# Patient Record
Sex: MALE | Race: BLACK OR AFRICAN AMERICAN | NOT HISPANIC OR LATINO | Employment: FULL TIME | URBAN - METROPOLITAN AREA
[De-identification: names, ages, dates, MRNs, and addresses within clinical notes are randomized per-mention and may not be internally consistent; named-entity substitution may affect disease eponyms.]

---

## 2018-01-15 ENCOUNTER — HOSPITAL ENCOUNTER (EMERGENCY)
Facility: HOSPITAL | Age: 28
Discharge: HOME | End: 2018-01-15
Payer: MEDICAID

## 2018-01-15 VITALS
TEMPERATURE: 98 F | OXYGEN SATURATION: 97 % | WEIGHT: 179 LBS | DIASTOLIC BLOOD PRESSURE: 91 MMHG | HEART RATE: 120 BPM | BODY MASS INDEX: 22.97 KG/M2 | RESPIRATION RATE: 20 BRPM | HEIGHT: 74 IN | SYSTOLIC BLOOD PRESSURE: 118 MMHG

## 2018-01-15 DIAGNOSIS — N48.9 PENILE LESION: Primary | ICD-10-CM

## 2018-01-15 PROCEDURE — 99283 EMERGENCY DEPT VISIT LOW MDM: CPT

## 2018-01-15 PROCEDURE — 87252 VIRUS INOCULATION TISSUE: CPT

## 2018-01-15 RX ORDER — ACYCLOVIR 400 MG/1
400 TABLET ORAL 3 TIMES DAILY
Qty: 30 TABLET | Refills: 0 | Status: SHIPPED | OUTPATIENT
Start: 2018-01-15 | End: 2020-02-11 | Stop reason: CLARIF

## 2018-01-15 NOTE — ED PROVIDER NOTES
Encounter Date: 1/15/2018       History     Chief Complaint   Patient presents with    Penile Discharge     c/o bump on foreskin and hurts to touch     The patient presents to the ER c/o a painful genital sore to the foreskin of his penis. He states that the sore or lesion has been present for the past 3-4 days. He states that this is a new problem. He denies any additional symptoms. He denies any pre-arrival treatment. He denies any mitigating or exacerbating factors. He is sexually active and denies any protection or prophylaxis use.           Review of patient's allergies indicates:  No Known Allergies  Past Medical History:   Diagnosis Date    Enlarged heart      History reviewed. No pertinent surgical history.  Family History   Problem Relation Age of Onset    Diabetes Mother     Hypertension Mother      Social History   Substance Use Topics    Smoking status: Current Every Day Smoker     Packs/day: 1.00     Types: Cigarettes    Smokeless tobacco: Never Used    Alcohol use No     Review of Systems   Constitutional: Negative for chills, fever and unexpected weight change.   HENT: Negative for sore throat.    Respiratory: Negative for cough and shortness of breath.    Cardiovascular: Negative for chest pain.   Gastrointestinal: Negative for abdominal pain, diarrhea, nausea, rectal pain and vomiting.   Genitourinary: Positive for genital sores and penile pain. Negative for decreased urine volume, difficulty urinating, discharge, dysuria, flank pain, frequency, penile swelling, scrotal swelling and testicular pain.   Musculoskeletal: Negative for arthralgias, back pain and joint swelling.   Skin: Negative for rash.   Allergic/Immunologic: Negative for immunocompromised state.   Neurological: Negative for dizziness, light-headedness and headaches.   Hematological: Negative for adenopathy.   Psychiatric/Behavioral: Negative for confusion.       Physical Exam     Initial Vitals [01/15/18 1102]   BP Pulse Resp  Temp SpO2   (!) 118/91 (!) 120 20 98.4 °F (36.9 °C) 97 %      MAP       100         Physical Exam    Nursing note and vitals reviewed.  Constitutional: He appears well-developed.   HENT:   Mouth/Throat: Oropharynx is clear and moist.   Eyes: Conjunctivae are normal.   Neck: Normal range of motion.   Cardiovascular: Normal rate.   Pulmonary/Chest: Breath sounds normal. No respiratory distress.   Abdominal: Soft. There is no tenderness. There is no guarding.   Genitourinary:   Genitourinary Comments: Uncircumcised penis. No penile swelling. No scrotal pain or swelling. No testicle pain or swelling. There is a sub-acute appearing lesion to his foreskin that appears to be herpetic. Mildly tender. Appears to be ulcerated from a ruptured vesicle. No urethral discharge. No inguinal adenopathy. No hernia.       Musculoskeletal: Normal range of motion.   Neurological: He is alert and oriented to person, place, and time. He has normal strength. No cranial nerve deficit or sensory deficit.   Skin: Skin is warm and dry.   Psychiatric: He has a normal mood and affect.         ED Course   Procedures  Labs Reviewed   CULTURE, VIRAL   No results found for this or any previous visit.           Medical Decision Making:   Initial Assessment:   Pt here for evaluation of genital lesion. New problem. Recent unprotected sexual intercourse with new partner.   Clinical Tests:   Lab Tests: Ordered  ED Management:  Lesion appears to be herpetic.   Viral culture obtained and sent to lab  Rx for Acyclovir provided.   Patient advised to follow up closely with his PCP and to have sexual partners tested/treated.               Attending Attestation:     Physician Attestation Statement for NP/PA:   I discussed this assessment and plan of this patient with the NP/PA, but I did not personally examine the patient. The face to face encounter was performed by the NP/PA.                  ED Course      Clinical Impression:   The encounter diagnosis was  Penile lesion.    Disposition:   Disposition: Discharged  Condition: Stable                        Hussein Weir PA-C  01/15/18 1200       José Miguel Grant MD  01/16/18 8812

## 2018-02-09 LAB
SPECIMEN SOURCE: NORMAL
VIRUS SPEC CULT: NORMAL

## 2018-06-09 ENCOUNTER — HOSPITAL ENCOUNTER (EMERGENCY)
Facility: HOSPITAL | Age: 28
Discharge: HOME | End: 2018-06-09

## 2018-06-09 VITALS
OXYGEN SATURATION: 98 % | SYSTOLIC BLOOD PRESSURE: 151 MMHG | WEIGHT: 190.56 LBS | HEIGHT: 75 IN | BODY MASS INDEX: 23.69 KG/M2 | DIASTOLIC BLOOD PRESSURE: 75 MMHG | TEMPERATURE: 98 F | HEART RATE: 100 BPM | RESPIRATION RATE: 20 BRPM

## 2018-06-09 DIAGNOSIS — N61.1 ACUTE ABSCESS OF AREOLA: Primary | ICD-10-CM

## 2018-06-09 PROCEDURE — 99283 EMERGENCY DEPT VISIT LOW MDM: CPT | Mod: 25

## 2018-06-09 PROCEDURE — 25000003 PHARM REV CODE 250: Performed by: PHYSICIAN ASSISTANT

## 2018-06-09 PROCEDURE — 10061 I&D ABSCESS COMP/MULTIPLE: CPT

## 2018-06-09 RX ORDER — LIDOCAINE HYDROCHLORIDE AND EPINEPHRINE 20; 10 MG/ML; UG/ML
10 INJECTION, SOLUTION INFILTRATION; PERINEURAL
Status: COMPLETED | OUTPATIENT
Start: 2018-06-09 | End: 2018-06-09

## 2018-06-09 RX ORDER — DICLOFENAC SODIUM 75 MG/1
75 TABLET, DELAYED RELEASE ORAL 2 TIMES DAILY
Qty: 20 TABLET | Refills: 0 | Status: SHIPPED | OUTPATIENT
Start: 2018-06-09 | End: 2020-02-11 | Stop reason: CLARIF

## 2018-06-09 RX ORDER — SULFAMETHOXAZOLE AND TRIMETHOPRIM 800; 160 MG/1; MG/1
2 TABLET ORAL 2 TIMES DAILY
Qty: 40 TABLET | Refills: 0 | Status: SHIPPED | OUTPATIENT
Start: 2018-06-09 | End: 2018-06-19

## 2018-06-09 RX ADMIN — LIDOCAINE HYDROCHLORIDE,EPINEPHRINE BITARTRATE 10 ML: 20; .01 INJECTION, SOLUTION INFILTRATION; PERINEURAL at 09:06

## 2018-06-09 NOTE — ED PROVIDER NOTES
"Encounter Date: 6/9/2018       History     Chief Complaint   Patient presents with    Abscess     Pt states, "I have a boil on my chest and under my arm."     The history is provided by the patient.   Abscess    This is a new problem. The current episode started several days ago. The problem occurs rarely. The problem has been gradually worsening. Affected Location: right sub areola, right axilla. The pain is at a severity of 3/10. The abscess is characterized by painfulness and redness. Pertinent negatives include no anorexia, no decrease in physical activity, not sleeping less, not drinking less, no fever, no diarrhea, no vomiting, no congestion, no rhinorrhea, no sore throat, no decreased responsiveness and no cough.     Review of patient's allergies indicates:  No Known Allergies  Past Medical History:   Diagnosis Date    Enlarged heart      No past surgical history on file.  Family History   Problem Relation Age of Onset    Diabetes Mother     Hypertension Mother      Social History   Substance Use Topics    Smoking status: Current Every Day Smoker     Packs/day: 1.00     Types: Cigarettes    Smokeless tobacco: Never Used    Alcohol use No     Review of Systems   Constitutional: Negative for decreased responsiveness, diaphoresis and fever.   HENT: Negative for congestion, rhinorrhea and sore throat.    Eyes: Negative for photophobia and redness.   Respiratory: Negative for cough and shortness of breath.    Cardiovascular: Negative for chest pain.   Gastrointestinal: Negative for abdominal pain, anorexia, constipation, diarrhea, nausea and vomiting.   Endocrine: Negative for polydipsia and polyphagia.   Genitourinary: Negative for dysuria and frequency.   Musculoskeletal: Negative for back pain.   Skin: Negative for rash.        Chest wall abscess and right axillary abscess   Neurological: Negative for weakness.   Hematological: Does not bruise/bleed easily.   Psychiatric/Behavioral: The patient is not " nervous/anxious.    All other systems reviewed and are negative.      Physical Exam     Initial Vitals [06/09/18 0846]   BP Pulse Resp Temp SpO2   (!) 151/75 100 20 98 °F (36.7 °C) 98 %      MAP       100.33         Physical Exam    Nursing note and vitals reviewed.  Constitutional: He appears well-developed and well-nourished.   HENT:   Head: Normocephalic and atraumatic.   Right Ear: External ear normal.   Left Ear: External ear normal.   Nose: Nose normal.   Mouth/Throat: Oropharynx is clear and moist.   Eyes: Conjunctivae and EOM are normal. Pupils are equal, round, and reactive to light.   Neck: Normal range of motion. Neck supple.   Cardiovascular: Normal rate, regular rhythm, normal heart sounds and intact distal pulses.   Pulmonary/Chest: Breath sounds normal. No respiratory distress. He has no wheezes. He has no rhonchi. He has no rales.   Abdominal: Soft. Bowel sounds are normal. He exhibits no distension. There is no tenderness. There is no rebound and no guarding.   Musculoskeletal: Normal range of motion.   Neurological: He is alert and oriented to person, place, and time. He has normal strength.   Skin: Skin is warm and dry.   2 cm x 2 cm area of erythema, swelling and TTP to right sub-areolar region;  5 mm x 5 mm area of erythema, TTP and swelling to right axilla;  No fluctuance noted   Psychiatric: He has a normal mood and affect. His behavior is normal. Judgment and thought content normal.         ED Course   I & D - Incision and Drainage  Date/Time: 6/9/2018 9:40 AM  Performed by: NICKIE NGUYEN.  Authorized by: NICKIE NGUYEN   Type: abscess  Body area: trunk  Location details: right breast  Anesthesia: local infiltration    Anesthesia:  Local Anesthetic: lidocaine 2% with epinephrine  Risk factor: underlying major vessel and  underlying major nerve  Scalpel size: 11  Incision type: elliptical  Complexity: complex  Drainage: pus  Drainage amount: scant  Wound treatment: incision,   deloculation,  wound packed and  drainage  Packing material: 1/4 in gauze  Patient tolerance: Patient tolerated the procedure well with no immediate complications        Labs Reviewed - No data to display       No orders to display                             Clinical Impression:   The encounter diagnosis was Acute abscess of areola.      Disposition:   Disposition: Discharged  Condition: Stable                        NELA Cardenas  06/09/18 0941

## 2018-06-12 ENCOUNTER — HOSPITAL ENCOUNTER (EMERGENCY)
Facility: HOSPITAL | Age: 28
Discharge: HOME | End: 2018-06-12

## 2018-06-12 VITALS
TEMPERATURE: 98 F | BODY MASS INDEX: 23.21 KG/M2 | SYSTOLIC BLOOD PRESSURE: 128 MMHG | OXYGEN SATURATION: 95 % | DIASTOLIC BLOOD PRESSURE: 69 MMHG | HEART RATE: 106 BPM | WEIGHT: 180.88 LBS | HEIGHT: 74 IN | RESPIRATION RATE: 20 BRPM

## 2018-06-12 DIAGNOSIS — Z51.89 WOUND CHECK, ABSCESS: Primary | ICD-10-CM

## 2018-06-12 PROCEDURE — 99283 EMERGENCY DEPT VISIT LOW MDM: CPT

## 2018-06-12 RX ORDER — HYDROCODONE BITARTRATE AND ACETAMINOPHEN 10; 325 MG/1; MG/1
1 TABLET ORAL EVERY 4 HOURS PRN
Qty: 12 TABLET | Refills: 0 | Status: SHIPPED | OUTPATIENT
Start: 2018-06-12 | End: 2018-06-22

## 2018-06-12 NOTE — ED PROVIDER NOTES
"Encounter Date: 6/12/2018       History     Chief Complaint   Patient presents with    Wound Check     Pt states, "I am here to get my packing taken out,:"     The history is provided by the patient.   Wound Check    He was treated in the ED 2 to 3 days ago. Previous treatment in the ED includes I&D of abscess. Treatments since wound repair include oral antibiotics and a wound recheck. There has been colored discharge from the wound. There is no redness present. There is no swelling present. The pain has improved. He has no difficulty moving the affected extremity or digit.     Review of patient's allergies indicates:  No Known Allergies  Past Medical History:   Diagnosis Date    Enlarged heart      No past surgical history on file.  Family History   Problem Relation Age of Onset    Diabetes Mother     Hypertension Mother      Social History   Substance Use Topics    Smoking status: Current Every Day Smoker     Packs/day: 1.00     Types: Cigarettes    Smokeless tobacco: Never Used    Alcohol use No     Review of Systems   Constitutional: Negative for diaphoresis and fever.   HENT: Negative for sore throat.    Eyes: Negative for photophobia and redness.   Respiratory: Negative for shortness of breath.    Cardiovascular: Negative for chest pain.   Gastrointestinal: Negative for abdominal pain, constipation, diarrhea, nausea and vomiting.   Endocrine: Negative for polydipsia and polyphagia.   Genitourinary: Negative for dysuria and frequency.   Musculoskeletal: Negative for back pain.   Skin: Positive for wound. Negative for rash.   Neurological: Negative for weakness.   Hematological: Does not bruise/bleed easily.   Psychiatric/Behavioral: The patient is not nervous/anxious.    All other systems reviewed and are negative.      Physical Exam     Initial Vitals [06/12/18 0907]   BP Pulse Resp Temp SpO2   128/69 106 20 98.3 °F (36.8 °C) 95 %      MAP       --         Physical Exam    Nursing note and vitals " reviewed.  Constitutional: He appears well-developed and well-nourished.   HENT:   Head: Normocephalic and atraumatic.   Right Ear: External ear normal.   Left Ear: External ear normal.   Nose: Nose normal.   Mouth/Throat: Oropharynx is clear and moist.   Eyes: Conjunctivae and EOM are normal. Pupils are equal, round, and reactive to light.   Neck: Normal range of motion. Neck supple.   Cardiovascular: Normal rate, regular rhythm, normal heart sounds and intact distal pulses.   Pulmonary/Chest: Breath sounds normal. No respiratory distress. He has no wheezes. He has no rhonchi. He has no rales.   Abdominal: Soft. Bowel sounds are normal. He exhibits no distension. There is no tenderness. There is no rebound and no guarding.   Musculoskeletal: Normal range of motion.   Neurological: He is alert and oriented to person, place, and time. He has normal strength.   Skin: Skin is warm and dry.   Healing abscess to right areola;  No erythema, swelling or active drainage noted;   Psychiatric: He has a normal mood and affect. His behavior is normal. Judgment and thought content normal.         ED Course   Procedures  Labs Reviewed - No data to display       No orders to display        Medical Decision Making:   ED Management:  Packing removed from abscess;  Abscess repacked with 1/4 sterile gauze.                      Clinical Impression:   The encounter diagnosis was Wound check, abscess.      Disposition:   Disposition: Discharged  Condition: Stable                        NELA Cardenas  06/12/18 0940

## 2018-06-14 VITALS
WEIGHT: 181.88 LBS | DIASTOLIC BLOOD PRESSURE: 78 MMHG | HEART RATE: 89 BPM | SYSTOLIC BLOOD PRESSURE: 135 MMHG | OXYGEN SATURATION: 98 % | HEIGHT: 74 IN | TEMPERATURE: 98 F | BODY MASS INDEX: 23.34 KG/M2 | RESPIRATION RATE: 18 BRPM

## 2018-06-14 PROCEDURE — 99281 EMR DPT VST MAYX REQ PHY/QHP: CPT

## 2018-06-15 ENCOUNTER — HOSPITAL ENCOUNTER (EMERGENCY)
Facility: HOSPITAL | Age: 28
Discharge: HOME | End: 2018-06-15
Attending: EMERGENCY MEDICINE

## 2018-06-15 DIAGNOSIS — L02.91 ABSCESS: Primary | ICD-10-CM

## 2018-06-15 RX ORDER — DICLOFENAC SODIUM 50 MG/1
50 TABLET, DELAYED RELEASE ORAL 2 TIMES DAILY
Qty: 15 TABLET | Refills: 0 | Status: SHIPPED | OUTPATIENT
Start: 2018-06-15 | End: 2020-02-11 | Stop reason: CLARIF

## 2018-06-15 NOTE — ED PROVIDER NOTES
SCRIBE #1 NOTE: I, Aminah Reyes, am scribing for, and in the presence of, Parker Tillman MD. I have scribed the entire note.      History      Chief Complaint   Patient presents with    Wound Check     requesting for packing to be removed from abscess on chest       Review of patient's allergies indicates:  No Known Allergies     HPI   HPI    6/15/2018, 12:08 AM   History obtained from the patient      History of Present Illness: Pollo Pak is a 28 y.o. male patient who presents to the Emergency Department for a wound check. Pt was told to return to the ER 2 days after an abscess removal for a f/u. No associated sxs reported. Patient denies any fever, chills, increased warmth/erythema/swelling to the area, drainage from the site, numbness/tingling, and all other sxs at this time. No further complaints or concerns at this time.     Arrival mode: Personal vehicle     PCP: Primary Doctor No       Past Medical History:  Past Medical History:   Diagnosis Date    Enlarged heart        Past Surgical History:  History reviewed. No pertinent surgical history.     Family History:  Family History   Problem Relation Age of Onset    Diabetes Mother     Hypertension Mother        Social History:  Social History     Social History Main Topics    Smoking status: Current Every Day Smoker     Packs/day: 1.00     Types: Cigarettes    Smokeless tobacco: Never Used    Alcohol use No    Drug use: No    Sexual activity: Yes     Partners: Female     Birth control/ protection: None       ROS   Review of Systems   Constitutional: Negative for chills, diaphoresis and fever.   HENT: Negative for congestion, rhinorrhea and sore throat.    Respiratory: Negative for cough and shortness of breath.    Cardiovascular: Negative for chest pain.   Gastrointestinal: Negative for diarrhea, nausea and vomiting.   Genitourinary: Negative for dysuria, frequency and hematuria.   Musculoskeletal: Negative for back pain.   Skin: Positive for  "wound (To RUQ chest). Negative for rash.        (-) drainage  (-) increased warmth/erythema/swelling   Neurological: Negative for dizziness, weakness, numbness and headaches.   Hematological: Does not bruise/bleed easily.     Physical Exam      Initial Vitals [06/14/18 2349]   BP Pulse Resp Temp SpO2   135/78 89 18 97.7 °F (36.5 °C) 98 %      MAP       --          Physical Exam  Nursing Notes and Vital Signs Reviewed.  Constitutional: Patient is in no acute distress. Well-developed and well-nourished.  Head: Atraumatic. Normocephalic.  Eyes: PERRL. EOM intact. Conjunctivae are not pale. No scleral icterus.  ENT: Mucous membranes are moist. Oropharynx is clear and symmetric.    Neck: Supple. Full ROM. No lymphadenopathy.  Cardiovascular: Regular rate. Regular rhythm. No murmurs, rubs, or gallops. Distal pulses are 2+ and symmetric.  Pulmonary/Chest: No respiratory distress. Clear to auscultation bilaterally. No wheezing or rales.  Abdominal: Soft and non-distended.  There is no tenderness.  No rebound, guarding, or rigidity.   Musculoskeletal: Moves all extremities. No obvious deformities. No edema.  Skin: 1 cm lac to RUQ of chest. No induration, fluctuance, or drainage from site.  Neurological:  Alert, awake, and appropriate.  Normal speech.  No acute focal neurological deficits are appreciated.  Psychiatric: Normal affect. Good eye contact. Appropriate in content.    ED Course    Procedures  ED Vital Signs:  Vitals:    06/14/18 2349   BP: 135/78   Pulse: 89   Resp: 18   Temp: 97.7 °F (36.5 °C)   TempSrc: Oral   SpO2: 98%   Weight: 82.5 kg (181 lb 14.1 oz)   Height: 6' 2" (1.88 m)              The Emergency Provider reviewed the vital signs and test results, which are outlined above.    ED Discussion     12:13 AM: Discussed with pt all pertinent ED information and results. Gave pt all f/u and return to the ED instructions. All questions and concerns were addressed at this time. Pt expresses understanding of " information and instructions, and is comfortable with plan to discharge. Pt is stable for discharge.    I discussed with patient and/or family/caretaker that evaluation in the ED does not suggest any emergent or life threatening medical conditions requiring immediate intervention beyond what was provided in the ED, and I believe patient is safe for discharge.  Regardless, an unremarkable evaluation in the ED does not preclude the development or presence of a serious of life threatening condition. As such, patient was instructed to return immediately for any worsening or change in current symptoms.      ED Medication(s):  Medications - No data to display    New Prescriptions    DICLOFENAC (VOLTAREN) 50 MG EC TABLET    Take 1 tablet (50 mg total) by mouth 2 (two) times daily.       Follow-up Information     [facility] In 2 days.    Specialty:  Internal Medicine  Contact information:  [address] [facility] [address] [phone]  Additional information:  (off [facility]) 1st floor           [facility] .    Specialty:  Emergency Medicine  Why:  If symptoms worsen  Contact information:  [address] [facility] [address] [phone]                   Medical Decision Making              Scribe Attestation:   Scribe #1: I performed the above scribed service and the documentation accurately describes the services I performed. I attest to the accuracy of the note.    Attending:   Physician Attestation Statement for Scribe #1: I, Parker Tillman MD, personally performed the services described in this documentation, as scribed by Aminah Reyes, in my presence, and it is both accurate and complete.          Clinical Impression       ICD-10-CM ICD-9-CM   1. Abscess L02.91 682.9       Disposition:   Disposition: Discharged  Condition: Stable         Parker Tillman MD  06/15/18 0034

## 2020-02-10 PROCEDURE — 99285 EMERGENCY DEPT VISIT HI MDM: CPT | Mod: 25

## 2020-02-10 PROCEDURE — 29125 APPL SHORT ARM SPLINT STATIC: CPT | Mod: 59,LT

## 2020-02-10 PROCEDURE — 20605 DRAIN/INJ JOINT/BURSA W/O US: CPT | Mod: LT

## 2020-02-11 ENCOUNTER — HOSPITAL ENCOUNTER (EMERGENCY)
Facility: HOSPITAL | Age: 30
Discharge: HOME | End: 2020-02-11
Attending: EMERGENCY MEDICINE
Payer: MEDICAID

## 2020-02-11 VITALS
OXYGEN SATURATION: 98 % | SYSTOLIC BLOOD PRESSURE: 147 MMHG | WEIGHT: 186.75 LBS | DIASTOLIC BLOOD PRESSURE: 83 MMHG | HEIGHT: 75 IN | TEMPERATURE: 98 F | HEART RATE: 87 BPM | RESPIRATION RATE: 20 BRPM | BODY MASS INDEX: 23.22 KG/M2

## 2020-02-11 DIAGNOSIS — M25.532 LEFT WRIST PAIN: ICD-10-CM

## 2020-02-11 DIAGNOSIS — S69.90XA WRIST INJURY: ICD-10-CM

## 2020-02-11 DIAGNOSIS — L03.113 CELLULITIS OF RIGHT UPPER EXTREMITY: Primary | ICD-10-CM

## 2020-02-11 PROCEDURE — 29125 APPL SHORT ARM SPLINT STATIC: CPT | Mod: 59,LT

## 2020-02-11 PROCEDURE — 20605 DRAIN/INJ JOINT/BURSA W/O US: CPT | Mod: LT

## 2020-02-11 PROCEDURE — 25000003 PHARM REV CODE 250: Performed by: EMERGENCY MEDICINE

## 2020-02-11 RX ORDER — LIDOCAINE HYDROCHLORIDE 10 MG/ML
1 INJECTION, SOLUTION EPIDURAL; INFILTRATION; INTRACAUDAL; PERINEURAL
Status: COMPLETED | OUTPATIENT
Start: 2020-02-11 | End: 2020-02-11

## 2020-02-11 RX ORDER — SULFAMETHOXAZOLE AND TRIMETHOPRIM 800; 160 MG/1; MG/1
1 TABLET ORAL 2 TIMES DAILY
Qty: 14 TABLET | Refills: 0 | Status: SHIPPED | OUTPATIENT
Start: 2020-02-11 | End: 2020-02-18

## 2020-02-11 RX ADMIN — LIDOCAINE HYDROCHLORIDE 10 MG: 10 INJECTION, SOLUTION EPIDURAL; INFILTRATION; INTRACAUDAL; PERINEURAL at 04:02

## 2020-02-11 NOTE — ED NOTES
Pt reports left hand/wrist pain after falling on it Sunday; wrist noted to be red and swollen. Denies any other associated symptoms.     Patient identifiers verified and correct for Pollo Pak.    LOC: The patient is awake, alert and aware of environment with an appropriate affect, the patient is oriented x 3 and speaking appropriately.  APPEARANCE: Patient resting comfortably and in no acute distress, patient is clean and well groomed, patient's clothing is properly fastened.  SKIN: The skin is warm and dry, color consistent with ethnicity, patient has normal skin turgor and moist mucus membranes, skin intact, no breakdown or bruising noted.  MUSCULOSKELETAL: Patient moving all extremities spontaneously.  RESPIRATORY: Airway is open and patent, respirations are spontaneous.  CARDIAC: Patient has a normal rate, no periphreal edema noted, capillary refill < 3 seconds.  ABDOMEN: Soft and non tender to palpation.

## 2020-02-11 NOTE — ED PROVIDER NOTES
SCRIBE #1 NOTE: I, Germaine Angel, am scribing for, and in the presence of, Eduar Rush MD. I have scribed the ED discussion.     History      Chief Complaint   Patient presents with    Hand Pain     left hand/wrist pain and swelling. pt reports falling on Sunday and caught himself with his hand.        Review of patient's allergies indicates:  No Known Allergies     HPI   HPI    2/11/2020, 1:17 AM   History obtained from the patient      History of Present Illness: Pollo Pak is a 29 y.o. male patient who presents to the Emergency Department for left hand pain x one week. Patient states that he fell down steps and caught himself on left hand.  Treatments tried include Aleve and ice pack.  Denies fever, vomiting, diarrhea, chest pain, SOB, headache, dizziness.       Arrival mode: Personal vehicle      PCP: Primary Doctor No       Past Medical History:  Past Medical History:   Diagnosis Date    Enlarged heart        Past Surgical History:  History reviewed. No pertinent surgical history.      Family History:  Family History   Problem Relation Age of Onset    Diabetes Mother     Hypertension Mother        Social History:  Social History     Tobacco Use    Smoking status: Current Every Day Smoker     Packs/day: 0.50     Types: Cigarettes    Smokeless tobacco: Never Used   Substance and Sexual Activity    Alcohol use: Yes     Comment: occ    Drug use: No    Sexual activity: Yes     Partners: Female     Birth control/protection: None       ROS   Review of Systems   Constitutional: Negative for chills and fever.   HENT: Negative for congestion and rhinorrhea.    Eyes: Negative for discharge and redness.   Respiratory: Negative for cough and wheezing.    Cardiovascular: Negative for chest pain and palpitations.   Gastrointestinal: Negative for diarrhea, nausea and vomiting.   Genitourinary: Negative for dysuria and frequency.   Musculoskeletal: Positive for arthralgias and myalgias.   Skin: Negative for  rash and wound.   Neurological: Negative for dizziness and headaches.       Physical Exam      Initial Vitals [02/10/20 2223]   BP Pulse Resp Temp SpO2   (!) 144/82 102 20 97.4 °F (36.3 °C) 98 %      MAP       --          Physical Exam  Nursing Notes and Vital Signs Reviewed.  Constitutional: Patient is in no apparent distress. Awake and alert. Well-developed and well-nourished.  Head: Atraumatic. Normocephalic.  Eyes: PERRL. EOM intact. Conjunctivae are not pale. No scleral icterus.  ENT: Mucous membranes are moist. Oropharynx is clear and symmetric.    Neck: Supple. Full ROM. No lymphadenopathy.  Cardiovascular: Regular rate. Regular rhythm. No murmurs, rubs, or gallops. Distal pulses are 2+ and symmetric.  Pulmonary/Chest: No respiratory distress. Clear to auscultation bilaterally. No wheezing, rales, or rhonchi.  Abdominal: Soft and non-distended.  There is no tenderness.  No rebound, guarding, or rigidity.  Musculoskeletal: Moves all extremities. No obvious deformities. No edema. No calf tenderness.  LUE:  Swelling of wrist.  Pain with flexion and extension of wrist.  Full ROM.  Brisk capillary refill. Radial pulse 2+.  TTP over thumb and first metacarpal.  Erythema of wrist and forearm.   Skin: Warm and dry.  Neurological:  Alert, awake, and appropriate.  Normal speech.  No acute focal neurological deficits are appreciated.  Psychiatric: Normal affect. Good eye contact. Appropriate in content.    ED Course    Arthrocentesis  Date/Time: 2/11/2020 5:07 AM  Performed by: Eduar Rush MD  Authorized by: Eduar Rush MD   Consent Done: Yes  Consent: Verbal consent obtained.  Risks and benefits: risks, benefits and alternatives were discussed  Consent given by: patient  Patient understanding: patient states understanding of the procedure being performed  Patient consent: the patient's understanding of the procedure matches consent given  Imaging studies: imaging studies available  Patient identity confirmed:  ", verbally with patient and name  Indications: joint swelling   Body area: wrist  Joint: left wrist  Local anesthesia used: yes  Anesthesia: local infiltration    Anesthesia:  Local anesthesia used: yes  Local Anesthetic: lidocaine 1% without epinephrine  Patient sedated: no  Preparation: Patient was prepped and draped in the usual sterile fashion.  Needle size: 20 G  Approach: lateral  Aspirate amount: 0 mL  Patient tolerance: Patient tolerated the procedure well with no immediate complications  Complications: No  Specimens: No  Implants: No    Splint Application  Date/Time: 2020 5:54 AM  Performed by: Eduar Rush MD  Authorized by: Eduar Rush MD   Consent Done: Yes  Consent: Verbal consent obtained.  Risks and benefits: risks, benefits and alternatives were discussed  Consent given by: patient  Patient understanding: patient states understanding of the procedure being performed  Patient consent: the patient's understanding of the procedure matches consent given  Procedure consent: procedure consent matches procedure scheduled  Relevant documents: relevant documents present and verified  Test results: test results available and properly labeled  Imaging studies: imaging studies available  Patient identity confirmed: , verbally with patient and name  Location details: left wrist  Splint type: wrist brace.  Post-procedure: The splinted body part was neurovascularly unchanged following the procedure.  Patient tolerance: Patient tolerated the procedure well with no immediate complications        ED Vital Signs:  Vitals:    02/10/20 2223 20 0130 20 0330   BP: (!) 144/82 (!) 141/80 (!) 142/81   Pulse: 102 98 89   Resp: 20 20 18   Temp: 97.4 °F (36.3 °C) 98 °F (36.7 °C)    TempSrc: Oral Oral    SpO2: 98% 98% 98%   Weight: 84.7 kg (186 lb 11.7 oz)     Height: 6' 3" (1.905 m)         Abnormal Lab Results:  Labs Reviewed - No data to display     All Lab Results:  none    Imaging " Results:  Imaging Results          X-Ray Forearm Left (In process)                X-Ray Wrist Complete Left (In process)                X-Ray Hand 3 view Left (In process)                Per ED physician, pt's L forearm XR results: NAF.    Per ED physician, pt's L wrist XR results: no fracture.    Per ED physician, pt's L hand XR results: no fracture.           The Emergency Provider reviewed the vital signs and test results, which are outlined above.    ED Discussion     5:10 AM: Reassessed pt at this time. Discussed with pt all pertinent ED information and results. Discussed pt dx and plan of tx. Gave pt all f/u and return to the ED instructions. All questions and concerns were addressed at this time. Pt expresses understanding of information and instructions, and is comfortable with plan to discharge. Pt is stable for discharge.    I discussed with patient and/or family/caretaker that evaluation in the ED does not suggest any emergent or life threatening medical conditions requiring immediate intervention beyond what was provided in the ED, and I believe patient is safe for discharge.  Regardless, an unremarkable evaluation in the ED does not preclude the development or presence of a serious of life threatening condition. As such, patient was instructed to return immediately for any worsening or change in current symptoms.      ED Medication(s):  Medications   lidocaine (PF) 10 mg/ml (1%) injection 10 mg (10 mg Other Given 2/11/20 0451)       New Prescriptions    SULFAMETHOXAZOLE-TRIMETHOPRIM 800-160MG (BACTRIM DS) 800-160 MG TAB    Take 1 tablet by mouth 2 (two) times daily. for 7 days       Follow-up Information     [facility]. Schedule an appointment as soon as possible for a visit in 2 days.    Why:  For re-evaluation and further treatment, For wound re-check  Contact information:  [address] [facility] [id number] [phone]             [facility]. Go today.     Specialty:  Emergency Medicine  Why:  If symptoms worsen, For re-evaluation and further treatment  Contact information:  [address] [facility] [address] [phone]                   Medical Decision Making    Medical Decision Making:   Clinical Tests:   Radiological Study: Ordered and Reviewed           Scribe Attestation:   Scribe #1: I performed the above scribed service and the documentation accurately describes the services I performed. I attest to the accuracy of the note.     Attending Attestation:               Physician Attestation for Scribe:  Physician Attestation Statement for Scribe #1: I, Eduar Rush MD, reviewed documentation, as scribed by Germaine Angel in my presence, and it is both accurate and complete.     Clinical Impression       ICD-10-CM ICD-9-CM   1. Cellulitis of right upper extremity L03.113 682.3   2. Wrist injury S69.90XA 959.3   3. Left wrist pain M25.532 719.43       Disposition:   Disposition: Discharged  Condition: Stable           Florence Ceballos PA-C  02/13/20 1220

## 2020-02-11 NOTE — ED NOTES
Patient sitting up in chair, no acute distress noted, awake, alert, and oriented x 3, calm, respirations even and unlabored, and skin is warm and dry. Patient verbalized understanding of status and plan of care. Patient denies needs at this time. Will continue to monitor.

## 2021-08-03 ENCOUNTER — HOSPITAL ENCOUNTER (EMERGENCY)
Facility: HOSPITAL | Age: 31
Discharge: HOME | End: 2021-08-03
Attending: FAMILY MEDICINE
Payer: MEDICAID

## 2021-08-03 VITALS
RESPIRATION RATE: 16 BRPM | OXYGEN SATURATION: 99 % | DIASTOLIC BLOOD PRESSURE: 75 MMHG | TEMPERATURE: 99 F | BODY MASS INDEX: 22.84 KG/M2 | HEART RATE: 88 BPM | SYSTOLIC BLOOD PRESSURE: 127 MMHG | WEIGHT: 182.75 LBS

## 2021-08-03 DIAGNOSIS — R41.82 ALTERED MENTAL STATUS: ICD-10-CM

## 2021-08-03 DIAGNOSIS — T50.901A ACCIDENTAL DRUG OVERDOSE, INITIAL ENCOUNTER: Primary | ICD-10-CM

## 2021-08-03 DIAGNOSIS — T50.901A DRUG OVERDOSE: ICD-10-CM

## 2021-08-03 LAB
ALBUMIN SERPL BCP-MCNC: 4.1 G/DL (ref 3.5–5.2)
ALP SERPL-CCNC: 77 U/L (ref 55–135)
ALT SERPL W/O P-5'-P-CCNC: 50 U/L (ref 10–44)
AMPHET+METHAMPHET UR QL: NEGATIVE
ANION GAP SERPL CALC-SCNC: 17 MMOL/L (ref 8–16)
AST SERPL-CCNC: 63 U/L (ref 10–40)
BARBITURATES UR QL SCN>200 NG/ML: NEGATIVE
BASOPHILS # BLD AUTO: 0.09 K/UL (ref 0–0.2)
BASOPHILS NFR BLD: 0.9 % (ref 0–1.9)
BENZODIAZ UR QL SCN>200 NG/ML: NEGATIVE
BILIRUB SERPL-MCNC: 0.8 MG/DL (ref 0.1–1)
BILIRUB UR QL STRIP: NEGATIVE
BNP SERPL-MCNC: <10 PG/ML (ref 0–99)
BUN SERPL-MCNC: 6 MG/DL (ref 6–20)
BZE UR QL SCN: NEGATIVE
CALCIUM SERPL-MCNC: 9.4 MG/DL (ref 8.7–10.5)
CANNABINOIDS UR QL SCN: NEGATIVE
CHLORIDE SERPL-SCNC: 106 MMOL/L (ref 95–110)
CLARITY UR: CLEAR
CO2 SERPL-SCNC: 18 MMOL/L (ref 23–29)
COLOR UR: YELLOW
CREAT SERPL-MCNC: 1 MG/DL (ref 0.5–1.4)
CREAT UR-MCNC: 186.5 MG/DL (ref 23–375)
CTP QC/QA: YES
DIFFERENTIAL METHOD: ABNORMAL
EOSINOPHIL # BLD AUTO: 0.2 K/UL (ref 0–0.5)
EOSINOPHIL NFR BLD: 1.5 % (ref 0–8)
ERYTHROCYTE [DISTWIDTH] IN BLOOD BY AUTOMATED COUNT: 15.1 % (ref 11.5–14.5)
EST. GFR  (AFRICAN AMERICAN): >60 ML/MIN/1.73 M^2
EST. GFR  (NON AFRICAN AMERICAN): >60 ML/MIN/1.73 M^2
ETHANOL SERPL-MCNC: 67 MG/DL
GLUCOSE SERPL-MCNC: 111 MG/DL (ref 70–110)
GLUCOSE UR QL STRIP: NEGATIVE
HCT VFR BLD AUTO: 36.8 % (ref 40–54)
HCV AB SERPL QL IA: NEGATIVE
HEP C VIRUS HOLD SPECIMEN: NORMAL
HGB BLD-MCNC: 13.6 G/DL (ref 14–18)
HGB UR QL STRIP: NEGATIVE
HIV 1+2 AB+HIV1 P24 AG SERPL QL IA: NEGATIVE
IMM GRANULOCYTES # BLD AUTO: 0.02 K/UL (ref 0–0.04)
IMM GRANULOCYTES NFR BLD AUTO: 0.2 % (ref 0–0.5)
KETONES UR QL STRIP: NEGATIVE
LEUKOCYTE ESTERASE UR QL STRIP: NEGATIVE
LYMPHOCYTES # BLD AUTO: 5.2 K/UL (ref 1–4.8)
LYMPHOCYTES NFR BLD: 51.9 % (ref 18–48)
MCH RBC QN AUTO: 35.5 PG (ref 27–31)
MCHC RBC AUTO-ENTMCNC: 37 G/DL (ref 32–36)
MCV RBC AUTO: 96 FL (ref 82–98)
METHADONE UR QL SCN>300 NG/ML: NEGATIVE
MONOCYTES # BLD AUTO: 1 K/UL (ref 0.3–1)
MONOCYTES NFR BLD: 9.6 % (ref 4–15)
NEUTROPHILS # BLD AUTO: 3.6 K/UL (ref 1.8–7.7)
NEUTROPHILS NFR BLD: 35.9 % (ref 38–73)
NITRITE UR QL STRIP: NEGATIVE
NRBC BLD-RTO: 0 /100 WBC
OPIATES UR QL SCN: NEGATIVE
PCP UR QL SCN>25 NG/ML: NEGATIVE
PH UR STRIP: 6 [PH] (ref 5–8)
PLATELET # BLD AUTO: 351 K/UL (ref 150–450)
PMV BLD AUTO: 8.9 FL (ref 9.2–12.9)
POTASSIUM SERPL-SCNC: 3.8 MMOL/L (ref 3.5–5.1)
PROT SERPL-MCNC: 7.4 G/DL (ref 6–8.4)
PROT UR QL STRIP: NEGATIVE
RBC # BLD AUTO: 3.83 M/UL (ref 4.6–6.2)
SARS-COV-2 RDRP RESP QL NAA+PROBE: NEGATIVE
SODIUM SERPL-SCNC: 141 MMOL/L (ref 136–145)
SP GR UR STRIP: 1.02 (ref 1–1.03)
TOXICOLOGY INFORMATION: NORMAL
TROPONIN I SERPL DL<=0.01 NG/ML-MCNC: <0.006 NG/ML (ref 0–0.03)
URN SPEC COLLECT METH UR: NORMAL
UROBILINOGEN UR STRIP-ACNC: NEGATIVE EU/DL
WBC # BLD AUTO: 10 K/UL (ref 3.9–12.7)

## 2021-08-03 PROCEDURE — 96361 HYDRATE IV INFUSION ADD-ON: CPT

## 2021-08-03 PROCEDURE — 85025 COMPLETE CBC W/AUTO DIFF WBC: CPT | Performed by: FAMILY MEDICINE

## 2021-08-03 PROCEDURE — 93010 EKG 12-LEAD: ICD-10-PCS | Mod: ,,, | Performed by: INTERNAL MEDICINE

## 2021-08-03 PROCEDURE — 80307 DRUG TEST PRSMV CHEM ANLYZR: CPT | Performed by: FAMILY MEDICINE

## 2021-08-03 PROCEDURE — 81003 URINALYSIS AUTO W/O SCOPE: CPT | Mod: 59 | Performed by: FAMILY MEDICINE

## 2021-08-03 PROCEDURE — 96374 THER/PROPH/DIAG INJ IV PUSH: CPT

## 2021-08-03 PROCEDURE — U0002 COVID-19 LAB TEST NON-CDC: HCPCS | Performed by: FAMILY MEDICINE

## 2021-08-03 PROCEDURE — 25000003 PHARM REV CODE 250: Performed by: FAMILY MEDICINE

## 2021-08-03 PROCEDURE — 82077 ASSAY SPEC XCP UR&BREATH IA: CPT | Performed by: FAMILY MEDICINE

## 2021-08-03 PROCEDURE — 93005 ELECTROCARDIOGRAM TRACING: CPT

## 2021-08-03 PROCEDURE — 86803 HEPATITIS C AB TEST: CPT | Performed by: FAMILY MEDICINE

## 2021-08-03 PROCEDURE — 80053 COMPREHEN METABOLIC PANEL: CPT | Performed by: FAMILY MEDICINE

## 2021-08-03 PROCEDURE — 87389 HIV-1 AG W/HIV-1&-2 AB AG IA: CPT | Performed by: FAMILY MEDICINE

## 2021-08-03 PROCEDURE — 84484 ASSAY OF TROPONIN QUANT: CPT | Performed by: FAMILY MEDICINE

## 2021-08-03 PROCEDURE — 99285 EMERGENCY DEPT VISIT HI MDM: CPT | Mod: 25

## 2021-08-03 PROCEDURE — 83880 ASSAY OF NATRIURETIC PEPTIDE: CPT | Performed by: FAMILY MEDICINE

## 2021-08-03 PROCEDURE — 93010 ELECTROCARDIOGRAM REPORT: CPT | Mod: ,,, | Performed by: INTERNAL MEDICINE

## 2021-08-03 RX ORDER — DILTIAZEM HYDROCHLORIDE 5 MG/ML
10 INJECTION INTRAVENOUS
Status: COMPLETED | OUTPATIENT
Start: 2021-08-03 | End: 2021-08-03

## 2021-08-03 RX ADMIN — DILTIAZEM HYDROCHLORIDE 10 MG: 5 INJECTION INTRAVENOUS at 12:08

## 2021-08-03 RX ADMIN — SODIUM CHLORIDE 1000 ML: 0.9 INJECTION, SOLUTION INTRAVENOUS at 12:08

## 2023-05-24 ENCOUNTER — HOSPITAL ENCOUNTER (EMERGENCY)
Facility: HOSPITAL | Age: 33
Discharge: HOME | End: 2023-05-25
Attending: EMERGENCY MEDICINE
Payer: MEDICAID

## 2023-05-24 DIAGNOSIS — R41.82 ALTERED MENTAL STATUS: ICD-10-CM

## 2023-05-24 DIAGNOSIS — F10.920 ALCOHOLIC INTOXICATION WITHOUT COMPLICATION: Primary | ICD-10-CM

## 2023-05-24 LAB
ALBUMIN SERPL BCP-MCNC: 3.8 G/DL (ref 3.5–5.2)
ALP SERPL-CCNC: 83 U/L (ref 55–135)
ALT SERPL W/O P-5'-P-CCNC: 45 U/L (ref 10–44)
AMPHET+METHAMPHET UR QL: NEGATIVE
ANION GAP SERPL CALC-SCNC: 17 MMOL/L (ref 8–16)
AST SERPL-CCNC: 44 U/L (ref 10–40)
BARBITURATES UR QL SCN>200 NG/ML: NEGATIVE
BASOPHILS # BLD AUTO: 0.18 K/UL (ref 0–0.2)
BASOPHILS NFR BLD: 2.2 % (ref 0–1.9)
BENZODIAZ UR QL SCN>200 NG/ML: NEGATIVE
BILIRUB SERPL-MCNC: 0.4 MG/DL (ref 0.1–1)
BILIRUB UR QL STRIP: NEGATIVE
BUN SERPL-MCNC: 7 MG/DL (ref 6–20)
BZE UR QL SCN: NEGATIVE
CALCIUM SERPL-MCNC: 8.6 MG/DL (ref 8.7–10.5)
CANNABINOIDS UR QL SCN: NEGATIVE
CHLORIDE SERPL-SCNC: 107 MMOL/L (ref 95–110)
CK SERPL-CCNC: 167 U/L (ref 20–200)
CLARITY UR: CLEAR
CO2 SERPL-SCNC: 23 MMOL/L (ref 23–29)
COLOR UR: COLORLESS
CREAT SERPL-MCNC: 0.9 MG/DL (ref 0.5–1.4)
CREAT UR-MCNC: 27.4 MG/DL (ref 23–375)
DIFFERENTIAL METHOD: ABNORMAL
EOSINOPHIL # BLD AUTO: 0.1 K/UL (ref 0–0.5)
EOSINOPHIL NFR BLD: 1.6 % (ref 0–8)
ERYTHROCYTE [DISTWIDTH] IN BLOOD BY AUTOMATED COUNT: 13.5 % (ref 11.5–14.5)
EST. GFR  (NO RACE VARIABLE): >60 ML/MIN/1.73 M^2
ETHANOL SERPL-MCNC: 427 MG/DL
GLUCOSE SERPL-MCNC: 95 MG/DL (ref 70–110)
GLUCOSE UR QL STRIP: NEGATIVE
HCT VFR BLD AUTO: 39.9 % (ref 40–54)
HGB BLD-MCNC: 14.8 G/DL (ref 14–18)
HGB UR QL STRIP: NEGATIVE
IMM GRANULOCYTES # BLD AUTO: 0.03 K/UL (ref 0–0.04)
IMM GRANULOCYTES NFR BLD AUTO: 0.4 % (ref 0–0.5)
KETONES UR QL STRIP: NEGATIVE
LEUKOCYTE ESTERASE UR QL STRIP: NEGATIVE
LYMPHOCYTES # BLD AUTO: 3.9 K/UL (ref 1–4.8)
LYMPHOCYTES NFR BLD: 46.9 % (ref 18–48)
MCH RBC QN AUTO: 37.5 PG (ref 27–31)
MCHC RBC AUTO-ENTMCNC: 37.1 G/DL (ref 32–36)
MCV RBC AUTO: 101 FL (ref 82–98)
METHADONE UR QL SCN>300 NG/ML: NEGATIVE
MONOCYTES # BLD AUTO: 0.6 K/UL (ref 0.3–1)
MONOCYTES NFR BLD: 6.8 % (ref 4–15)
NEUTROPHILS # BLD AUTO: 3.5 K/UL (ref 1.8–7.7)
NEUTROPHILS NFR BLD: 42.1 % (ref 38–73)
NITRITE UR QL STRIP: NEGATIVE
NRBC BLD-RTO: 0 /100 WBC
OPIATES UR QL SCN: NEGATIVE
PCP UR QL SCN>25 NG/ML: NEGATIVE
PH UR STRIP: 6 [PH] (ref 5–8)
PLATELET # BLD AUTO: 369 K/UL (ref 150–450)
PMV BLD AUTO: 9.5 FL (ref 9.2–12.9)
POTASSIUM SERPL-SCNC: 3.8 MMOL/L (ref 3.5–5.1)
PROT SERPL-MCNC: 6.7 G/DL (ref 6–8.4)
PROT UR QL STRIP: NEGATIVE
RBC # BLD AUTO: 3.95 M/UL (ref 4.6–6.2)
SODIUM SERPL-SCNC: 147 MMOL/L (ref 136–145)
SP GR UR STRIP: <1.005 (ref 1–1.03)
TOXICOLOGY INFORMATION: NORMAL
URN SPEC COLLECT METH UR: ABNORMAL
UROBILINOGEN UR STRIP-ACNC: NEGATIVE EU/DL
WBC # BLD AUTO: 8.29 K/UL (ref 3.9–12.7)

## 2023-05-24 PROCEDURE — 85025 COMPLETE CBC W/AUTO DIFF WBC: CPT | Performed by: EMERGENCY MEDICINE

## 2023-05-24 PROCEDURE — 96361 HYDRATE IV INFUSION ADD-ON: CPT

## 2023-05-24 PROCEDURE — 63600175 PHARM REV CODE 636 W HCPCS: Performed by: EMERGENCY MEDICINE

## 2023-05-24 PROCEDURE — 99284 EMERGENCY DEPT VISIT MOD MDM: CPT | Mod: 25

## 2023-05-24 PROCEDURE — 82077 ASSAY SPEC XCP UR&BREATH IA: CPT | Performed by: EMERGENCY MEDICINE

## 2023-05-24 PROCEDURE — 93005 ELECTROCARDIOGRAM TRACING: CPT

## 2023-05-24 PROCEDURE — 80307 DRUG TEST PRSMV CHEM ANLYZR: CPT | Performed by: EMERGENCY MEDICINE

## 2023-05-24 PROCEDURE — 25000003 PHARM REV CODE 250: Performed by: EMERGENCY MEDICINE

## 2023-05-24 PROCEDURE — 93010 EKG 12-LEAD: ICD-10-PCS | Mod: ,,, | Performed by: INTERNAL MEDICINE

## 2023-05-24 PROCEDURE — 82550 ASSAY OF CK (CPK): CPT | Performed by: EMERGENCY MEDICINE

## 2023-05-24 PROCEDURE — 93010 ELECTROCARDIOGRAM REPORT: CPT | Mod: ,,, | Performed by: INTERNAL MEDICINE

## 2023-05-24 PROCEDURE — 80053 COMPREHEN METABOLIC PANEL: CPT | Performed by: EMERGENCY MEDICINE

## 2023-05-24 PROCEDURE — 96360 HYDRATION IV INFUSION INIT: CPT

## 2023-05-24 PROCEDURE — 81003 URINALYSIS AUTO W/O SCOPE: CPT | Mod: 59 | Performed by: EMERGENCY MEDICINE

## 2023-05-24 RX ADMIN — SODIUM CHLORIDE 1000 ML: 9 INJECTION, SOLUTION INTRAVENOUS at 09:05

## 2023-05-24 RX ADMIN — THIAMINE HYDROCHLORIDE: 100 INJECTION, SOLUTION INTRAMUSCULAR; INTRAVENOUS at 11:05

## 2023-05-25 VITALS
DIASTOLIC BLOOD PRESSURE: 85 MMHG | TEMPERATURE: 98 F | HEART RATE: 87 BPM | OXYGEN SATURATION: 95 % | RESPIRATION RATE: 18 BRPM | SYSTOLIC BLOOD PRESSURE: 129 MMHG | BODY MASS INDEX: 22.63 KG/M2 | HEIGHT: 75 IN | WEIGHT: 182 LBS

## 2023-05-25 NOTE — ED PROVIDER NOTES
SCRIBE #1 NOTE: I, Zenaida Pederson, am scribing for, and in the presence of, Connie Reyes MD. I have scribed the entire note.       History     Chief Complaint   Patient presents with    Altered Mental Status     Pt presents to ed via ems, passerby called because the pt was [place] the middle [place] [address], pt unable to answer questions or follow commands, no signs of trauma      Review of patient's allergies indicates:  No Known Allergies      History of Present Illness     HPI    5/24/2023, 9:45 PM  History obtained from the patient      History of Present Illness: Pollo Pak is a 33 y.o. male patient with a PMHx of enlarged who presents to the Emergency Department for evaluation of altered mental status which onset several hours PTA. Symptoms are constant and moderate in severity. Pt was found laying in the middle of the road. Once EMS arrived, he was unable to answer questions or follow commands. EtOH smell was present at bedside.  Patient denies any cough, fever, n/v, and all other sxs at this time. Pt did not have signs of trauma. No further complaints or concerns at this time.       Arrival mode: EMS    PCP: Primary Doctor No        Past Medical History:  Past Medical History:   Diagnosis Date    Enlarged heart        Past Surgical History:  History reviewed. No pertinent surgical history.      Family History:  Family History   Problem Relation Age of Onset    Diabetes Mother     Hypertension Mother        Social History:  Social History     Tobacco Use    Smoking status: Every Day     Packs/day: 0.50     Types: Cigarettes    Smokeless tobacco: Never   Substance and Sexual Activity    Alcohol use: Yes     Comment: occ    Drug use: No    Sexual activity: Yes     Partners: Female     Birth control/protection: None        Review of Systems     Review of Systems   Constitutional:  Negative for fever.   Respiratory:  Negative for cough.    Gastrointestinal:  Negative for nausea and vomiting.  "  Psychiatric/Behavioral:  Positive for confusion.       Physical Exam     Initial Vitals   BP Pulse Resp Temp SpO2   05/24/23 2015 05/24/23 2015 05/24/23 2015 05/24/23 2058 05/24/23 2015   (!) 144/90 84 18 97.9 °F (36.6 °C) 97 %      MAP       --                 Physical Exam  Nursing Notes and Vital Signs Reviewed.  Constitutional: Patient is in no apparent distress. Well-developed and well-nourished.  Head: Atraumatic. Normocephalic.  Eyes: PERRL. EOM intact. Conjunctivae are not pale. No scleral icterus.  ENT: Mucous membranes are moist. Oropharynx is clear and symmetric.    Neck: Supple. Full ROM. No lymphadenopathy.  Cardiovascular: Regular rate. Regular rhythm. No murmurs, rubs, or gallops. Distal pulses are 2+ and symmetric.  Pulmonary/Chest: No respiratory distress. Clear to auscultation bilaterally. No wheezing or rales.  Abdominal: Soft and non-distended.  There is no tenderness.  No rebound, guarding, or rigidity. Good bowel sounds.  Genitourinary: No CVA tenderness  Musculoskeletal: Moves all extremities. No obvious deformities. No edema. No calf tenderness.  Skin: Warm and dry.  Neurological:  Alert and awake. Not oriented to date. No trauma.   Psychiatric: Normal affect. Good eye contact. Appropriate in content.     ED Course   Procedures  ED Vital Signs:  Vitals:    05/24/23 2015 05/24/23 2040 05/24/23 2058 05/24/23 2103   BP: (!) 144/90   (!) 141/89   Pulse: 84 73  90   Resp: 18   20   Temp:   97.9 °F (36.6 °C)    TempSrc:   Oral    SpO2: 97%   98%   Weight: 82.6 kg (182 lb)      Height: 6' 3" (1.905 m)       05/25/23 0004 05/25/23 0028   BP: 129/85    Pulse: 70 87   Resp: 20 18   Temp:  97.8 °F (36.6 °C)   TempSrc:  Oral   SpO2: 98% 95%   Weight:     Height:         Abnormal Lab Results:  Labs Reviewed   URINALYSIS, REFLEX TO URINE CULTURE - Abnormal; Notable for the following components:       Result Value    Color, UA Colorless (*)     Specific Gravity, UA <1.005 (*)     All other components " within normal limits    Narrative:     Specimen Source->Urine   CBC W/ AUTO DIFFERENTIAL - Abnormal; Notable for the following components:    RBC 3.95 (*)     Hematocrit 39.9 (*)      (*)     MCH 37.5 (*)     MCHC 37.1 (*)     Basophil % 2.2 (*)     All other components within normal limits   COMPREHENSIVE METABOLIC PANEL - Abnormal; Notable for the following components:    Sodium 147 (*)     Calcium 8.6 (*)     AST 44 (*)     ALT 45 (*)     Anion Gap 17 (*)     All other components within normal limits   ALCOHOL,MEDICAL (ETHANOL) - Abnormal; Notable for the following components:    Alcohol, Serum 427 (*)     All other components within normal limits    Narrative:      ALC critical result(s) called and verbal readback obtained from   eugenio vargas rn by [email or web address] 05/24/2023 22:09   DRUG SCREEN PANEL, URINE EMERGENCY    Narrative:     Specimen Source->Urine   CK   URINALYSIS, REFLEX TO URINE CULTURE   DRUG SCREEN PANEL, URINE EMERGENCY        All Lab Results:  Results for orders placed or performed during the hospital encounter of 05/24/23   Urinalysis, Reflex to Urine Culture Urine, Clean Catch    Specimen: Urine   Result Value Ref Range    Specimen UA Urine, Clean Catch     Color, UA Colorless (A) Yellow, Straw, Susi    Appearance, UA Clear Clear    pH, UA 6.0 5.0 - 8.0    Specific Gravity, UA <1.005 (A) 1.005 - 1.030    Protein, UA Negative Negative    Glucose, UA Negative Negative    Ketones, UA Negative Negative    Bilirubin (UA) Negative Negative    Occult Blood UA Negative Negative    Nitrite, UA Negative Negative    Urobilinogen, UA Negative <2.0 EU/dL    Leukocytes, UA Negative Negative   Drug screen panel, emergency   Result Value Ref Range    Benzodiazepines Negative Negative    Methadone metabolites Negative Negative    Cocaine (Metab.) Negative Negative    Opiate Scrn, Ur Negative Negative    Barbiturate Screen, Ur Negative Negative    Amphetamine Screen, Ur Negative Negative    THC Negative Negative     Phencyclidine Negative Negative    Creatinine, Urine 27.4 23.0 - 375.0 mg/dL    Toxicology Information SEE COMMENT    CBC auto differential   Result Value Ref Range    WBC 8.29 3.90 - 12.70 K/uL    RBC 3.95 (L) 4.60 - 6.20 M/uL    Hemoglobin 14.8 14.0 - 18.0 g/dL    Hematocrit 39.9 (L) 40.0 - 54.0 %     (H) 82 - 98 fL    MCH 37.5 (H) 27.0 - 31.0 pg    MCHC 37.1 (H) 32.0 - 36.0 g/dL    RDW 13.5 11.5 - 14.5 %    Platelets 369 150 - 450 K/uL    MPV 9.5 9.2 - 12.9 fL    Immature Granulocytes 0.4 0.0 - 0.5 %    Gran # (ANC) 3.5 1.8 - 7.7 K/uL    Immature Grans (Abs) 0.03 0.00 - 0.04 K/uL    Lymph # 3.9 1.0 - 4.8 K/uL    Mono # 0.6 0.3 - 1.0 K/uL    Eos # 0.1 0.0 - 0.5 K/uL    Baso # 0.18 0.00 - 0.20 K/uL    nRBC 0 0 /100 WBC    Gran % 42.1 38.0 - 73.0 %    Lymph % 46.9 18.0 - 48.0 %    Mono % 6.8 4.0 - 15.0 %    Eosinophil % 1.6 0.0 - 8.0 %    Basophil % 2.2 (H) 0.0 - 1.9 %    Differential Method Automated    Comprehensive metabolic panel   Result Value Ref Range    Sodium 147 (H) 136 - 145 mmol/L    Potassium 3.8 3.5 - 5.1 mmol/L    Chloride 107 95 - 110 mmol/L    CO2 23 23 - 29 mmol/L    Glucose 95 70 - 110 mg/dL    BUN 7 6 - 20 mg/dL    Creatinine 0.9 0.5 - 1.4 mg/dL    Calcium 8.6 (L) 8.7 - 10.5 mg/dL    Total Protein 6.7 6.0 - 8.4 g/dL    Albumin 3.8 3.5 - 5.2 g/dL    Total Bilirubin 0.4 0.1 - 1.0 mg/dL    Alkaline Phosphatase 83 55 - 135 U/L    AST 44 (H) 10 - 40 U/L    ALT 45 (H) 10 - 44 U/L    Anion Gap 17 (H) 8 - 16 mmol/L    eGFR >60 >60 mL/min/1.73 m^2   Ethanol   Result Value Ref Range    Alcohol, Serum 427 (HH) <10 mg/dL   CPK   Result Value Ref Range     20 - 200 U/L        Imaging Results:  Imaging Results    None          The EKG was ordered, reviewed, and independently interpreted by the ED provider.  Interpretation time: 21:38  Rate: 59 BPM  Rhythm: sinus bradycardia  Interpretation: No acute ST changes. No STEMI.             The Emergency Provider reviewed the vital signs and test  results, which are outlined above.     ED Discussion     12:39 AM: Reassessed pt at this time.  Discussed with pt all pertinent ED information and results. Discussed pt dx and plan of tx. Gave pt all f/u and return to the ED instructions. All questions and concerns were addressed at this time. Pt expresses understanding of information and instructions, and is comfortable with plan to discharge. Pt is stable for discharge.    I discussed with patient and/or family/caretaker that evaluation in the ED does not suggest any emergent or life threatening medical conditions requiring immediate intervention beyond what was provided in the ED, and I believe patient is safe for discharge.  Regardless, an unremarkable evaluation in the ED does not preclude the development or presence of a serious of life threatening condition. As such, patient was instructed to return immediately for any worsening or change in current symptoms.        Medical Decision Making:   Clinical Tests:   Lab Tests: Ordered and Reviewed  Medical Tests: Ordered and Reviewed         ED Medication(s):  Medications   sodium chloride 0.9% 1,000 mL with mvi, (ADULT) no.4 with vit K 3,300 unit- 150 mcg/10 mL 10 mL, thiamine 100 mg, folic acid 1 mg infusion ( Intravenous Stopped 5/25/23 0043)       There are no discharge medications for this patient.       Follow-up Information       [facility] In 1 day.    Contact information:  [address] [facility] [address] [phone]               [facility] - Emergency Dept..    Specialty: Emergency Medicine  Why: As needed, If symptoms worsen  Contact information:  [address] [facility] [address] [phone]                               Scribe Attestation:   Scribe #1: I performed the above scribed service and the documentation accurately describes the services I performed. I attest to the accuracy of the note.     Attending:   Physician Attestation Statement for  Scribe #1: I, Connie Reyes MD, personally performed the services described in this documentation, as scribed by Zenaida Pederson, in my presence, and it is both accurate and complete.           Clinical Impression       ICD-10-CM ICD-9-CM   1. Alcoholic intoxication without complication  F10.920 305.00   2. Altered mental status  R41.82 780.97       Disposition:   Disposition: Discharged  Condition: Stable      Connie Reyes MD  05/25/23 0502